# Patient Record
(demographics unavailable — no encounter records)

---

## 2025-02-13 NOTE — REVIEW OF SYSTEMS
[Constipation] : constipation [Negative] : Heme/Lymph [Recent Change In Weight] : ~T recent weight change [FreeTextEntry2] : +5lbs [FreeTextEntry3] : poor vision [FreeTextEntry7] : Hx external hemorrhoids  [de-identified] : ruma eczema  [FreeTextEntry1] : preDM, dyslipidemia

## 2025-02-13 NOTE — ASSESSMENT
[FreeTextEntry1] :  58 F with a PMHx of constipation/hemorrhoids, PreDM, dyslipidemia, poor vision and eczema here for f/u. #1 HTN: Renewed losartan-hctz 50/12.5mg daily. Continue diet and exercise. Risks of uncontrolled hypertension explained and understood. Check BW and urinalysis. F/u in 4 months and that will be CPE. #2 Breast calcification R breast: Sees [facility] breast surgeon PRN. Monitoring. Script provided for mammo/sono and get 2024 reports.  #3 Eczema: Asymptomatic. Sees derm Dr Drake every 6 months. #4 Dyslipidemia: Continue low cholesterol diet, exercise and maintaining a good weight. Check Lipids and LFTs.  #5 Impaired fasting glucose/PreDM: Last A1C was 5.8% and will recheck today. #6 Heartburn: Follow a GERD diet. Using tums PRN. #7 Poor Vision: UTD optho. #8 Post menopause: check vitamin D. #9 HCM: BW and UA done today. UTD with CPE and EKG. UTD with colonoscopy and derm. All the patient's questions and concerns were answered at the time of the visit. The patient is agreeable to above treatment plan.

## 2025-02-13 NOTE — PHYSICAL EXAM
[No Acute Distress] : no acute distress [Well-Appearing] : well-appearing [Normal Sclera/Conjunctiva] : normal sclera/conjunctiva [PERRL] : pupils equal round and reactive to light [EOMI] : extraocular movements intact [Normal Outer Ear/Nose] : the outer ears and nose were normal in appearance [Normal Oropharynx] : the oropharynx was normal [No JVD] : no jugular venous distention [No Lymphadenopathy] : no lymphadenopathy [Supple] : supple [Thyroid Normal, No Nodules] : the thyroid was normal and there were no nodules present [No Respiratory Distress] : no respiratory distress  [No Accessory Muscle Use] : no accessory muscle use [Clear to Auscultation] : lungs were clear to auscultation bilaterally [Normal Rate] : normal rate  [Regular Rhythm] : with a regular rhythm [Normal S1, S2] : normal S1 and S2 [No Murmur] : no murmur heard [No Edema] : there was no peripheral edema [Soft] : abdomen soft [Non Tender] : non-tender [Non-distended] : non-distended [Normal Bowel Sounds] : normal bowel sounds [Normal Posterior Cervical Nodes] : no posterior cervical lymphadenopathy [Normal Anterior Cervical Nodes] : no anterior cervical lymphadenopathy [No CVA Tenderness] : no CVA  tenderness [No Spinal Tenderness] : no spinal tenderness [No Joint Swelling] : no joint swelling [Grossly Normal Strength/Tone] : grossly normal strength/tone [No Rash] : no rash [Coordination Grossly Intact] : coordination grossly intact [No Focal Deficits] : no focal deficits [Normal Gait] : normal gait [Deep Tendon Reflexes (DTR)] : deep tendon reflexes were 2+ and symmetric [Speech Grossly Normal] : speech grossly normal [Memory Grossly Normal] : memory grossly normal [Normal Affect] : the affect was normal [Normal Mood] : the mood was normal [Normal Insight/Judgement] : insight and judgment were intact [Comprehensive Foot Exam Normal] : Right and left foot were examined and both feet are normal. No ulcers in either foot. Toes are normal and with full ROM.  Normal tactile sensation with monofilament testing throughout both feet [de-identified] : +5lbs [de-identified] :  glasses

## 2025-02-13 NOTE — ADDENDUM
[FreeTextEntry1] : Documented by Kyung Hall acting as a scribe for Dr. Mariann Multani. 02/13/2025  All medical record entries made by the Scribe were at my, Dr. Mariann Multani, direction and personally dictated by me on 02/13/2025. I have reviewed the chart and agree that the record accurately reflects my personal performance of the history, physical exam, assessment and plan. I have also personally directed, reviewed, and agreed with the chart.

## 2025-02-13 NOTE — HISTORY OF PRESENT ILLNESS
[de-identified] :  58 F with a PMHx of constipation/hemorrhoids, PreDM, dyslipidemia, poor vision and eczema here for f/u. Patient just arrived from family trip to [facility]. She also started playing pickleball and notes having knee pain and muscle achiness.  Reports that arthritis in knees has been ok and didn't need gel shots so far this year.  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  HTN controlled on med. Wants a refill.  Pt wants a chest x ray from secondhand smoke exposure because her mother  from lung cancer. She hasn't gone for this yet. Continues to eat well and is exercising. She will be starting tennis soon and sees  2x week.  PreDM and continues to diet and exercise. Patient notes hip pain that has now resolved. Says exercise and taking OTC medications helped.  She has poor vision and goes to [facility] annually and is due.  She is eating and sleeping well. The patient denies any current fevers, chills, cold symptoms, fatigue, headaches, dizziness, blurry vision, chest pain, palpitations, shortness of breath, dyspnea on exertion, cough, urinary symptoms or any nausea/vomiting/diarrhea. UTD PAP and UTD colonoscopy (2022). Had mammo with breast surgeon at [facility] and is due in May.

## 2025-02-13 NOTE — HEALTH RISK ASSESSMENT
[No falls in past year] : Patient reported no falls in the past year [0] : 2) Feeling down, depressed, or hopeless: Not at all (0) [PHQ-2 Negative - No further assessment needed] : PHQ-2 Negative - No further assessment needed [Never] : Never [Yes] : Yes [Monthly or less (1 pt)] : Monthly or less (1 point) [1 or 2 (0 pts)] : 1 or 2 (0 points) [Never (0 pts)] : Never (0 points) [Audit-CScore] : 1 [[id number]] : 0

## 2025-04-23 NOTE — ASSESSMENT
[FreeTextEntry1] : here for acute exacerbation of right knee primary OA  completed visco-3 series in feb of 2024 with relief until last week  will submit for repeat appario  use of cryotherapy discussed  activity modification discussed will rtc once gels received   I am working today under the supervision of Dr. Smith and I am following the plan of care of Dr. Smith as described by him on this date. Progress note completed by Akua Donis PA-C under the supervision of Dr. Smith.

## 2025-04-23 NOTE — IMAGING
[de-identified] : Varus deformity Mild effusion, no warmth, no ecchymosis Medial joint line tenderness to palpation  Range of motion 0-110 with associated crepitus

## 2025-04-23 NOTE — HISTORY OF PRESENT ILLNESS
[6] : 6 [4] : 4 [Dull/Aching] : dull/aching [de-identified] : 1/4/2024: 56 yo F here for increased right knee pain that last few weeks without injury. Last series of visco finished on 12/21/2022.   2/15/24: here to follow up on right knee. CSI from 1/4/24 gave relief for 5 weeks. Will begin visco-3 today.   2/22/24: Right knee Visco-3 #2.   2/29/24: Right knee Visco-3 #3.   4/23/25: patient is here today to follow up on her right knee pain  [FreeTextEntry1] : right knee

## 2025-05-05 NOTE — PHYSICAL EXAM
[Right] : right hand [Dorsal Wrist] : dorsal wrist [Volar Wrist] : volar wrist [Radial Styloid] : radial styloid [Ulna Styloid] : ulna styloid [TFCC] : TFCC [First Dorsal Compartment] : first dorsal compartment [] : palpable radial pulse [FreeTextEntry9] : Decreased ROM with pain.

## 2025-05-05 NOTE — HISTORY OF PRESENT ILLNESS
[8] : 8 [Throbbing] : throbbing [Constant] : constant [Nothing helps with pain getting better] : Nothing helps with pain getting better [Retired] : Work status: retired [de-identified] : 5/5/25 57 y/o F here with RT wrist pain starting 2 days ago. Denies injury or trauma. No prior HX.  [] : no

## 2025-05-05 NOTE — ASSESSMENT
[FreeTextEntry1] : ___ We reviewed the findings and her options. MDP x 1 to start tomorrow. Diclofenac this evening, then after pack is completed, if needed, with GI precautions. Thumb spica brace for comfort. RICE. Will see Dr. Bhatti in 1 week.  Patient seen by Joceline FOOTE who determined the assessment and plan and participated in all aspects of the office encounter.

## 2025-05-05 NOTE — REASON FOR VISIT
[FreeTextEntry2] : NEW CONDITION 5/5/25 This is a 58 year old RHD F with right wrist pain that started without injury on 5/3/25.  No treatment.  No numbness.  There is swelling.  Diclofenac didn't do much.  She has been wearing an OTC brace with a bar on the palm without much relief.

## 2025-05-12 NOTE — REASON FOR VISIT
[FreeTextEntry2] : 5/12/25: 57 y/o patient is here for R wrist pain since 1-2 weeks ago, was treated at our urgent care last week. pain has improved

## 2025-05-12 NOTE — ASSESSMENT
[FreeTextEntry1] : Thumb CMC  arthritis is a progressive problem that once occurs will be a chronic issue that will likely continue until surgical treatment is necessary. Treatment options for arthritis include OTC NSAIDS, prescription NSAIDS, ice, bracing, OT, cortisone injection, and surgery. Surgical options include arthroplasty and fusion of the arthritic joint.   I recommend the patient take over the counter medication such as Advil/Motrin/Aleve or Tylenol for pain and inflammation  I rec the patient ice the affected body part for 10-15mins 3-4 times a day for 5 days to reduce pain, swelling, and inflammation   Return prn- consider injection

## 2025-05-12 NOTE — HISTORY OF PRESENT ILLNESS
[3] : 3 [2] : 2 [] : yes [de-identified] : R wrist pain for about 2 weeks  Pain over the base of the thumb Was prescribed MDP, diclofenac and a brace at [facility]  She has finished the MDP, with some relief   For the first time I independently reviewed xrays from [facility] done on 5/5/2025 PA/Lateral/Oblique views of the R hand shows, thumb CMC OA   For the first time I independently reviewed xrays from [facility] done on 5/5/2025 PA/Lateral/Oblique views of the R wrist shows thumb CMC OA [FreeTextEntry1] : R wrist [de-identified] : GONZALO Castro [de-identified] : brace

## 2025-05-12 NOTE — PHYSICAL EXAM
[de-identified] : R hand: +thumb CMC swelling +thumb CMC tenderness Decreased thumb ROM +Basal grind test

## 2025-05-14 NOTE — IMAGING
[de-identified] : Varus deformity Mild effusion, no warmth, no ecchymosis Medial joint line tenderness to palpation  Range of motion 0-110 with associated crepitus

## 2025-05-14 NOTE — PROCEDURE
[FreeTextEntry3] : Large joint injection  was performed of the right knee.  The indication for this procedure was pain, inflammation and x-ray evidence of Osteoarthritis on this or prior visit. The site was prepped with alcohol, betadine, ethyl chloride sprayed topically and sterile technique used.  An injection of visco-3 series; # 2 was used.   Patient was advised to call if redness, pain or fever occur, apply ice for 15 minutes out of every hour for the next 12-24 hours as tolerated and patient was advised to rest the joint(s) for 2 days. Patient has tried OTC's including aspirin, Ibuprofen, Aleve, etc or prescription NSAIDS, and/or exercises at home and/or physical therapy without satisfactory response, patient had decreased mobility in the joint and the risks benefits, and alternatives have been discussed, and verbal consent was obtained.  Ultrasound guidance was indicated for this patient due to prior failure or difficult injection. All ultrasound images have been permanently captured and stored accordingly in our picture archiving and communication system. Visualization of the needle and placement of injection was performed without complication.

## 2025-05-21 NOTE — IMAGING
[de-identified] : Varus deformity Mild effusion, no warmth, no ecchymosis Medial joint line tenderness to palpation  Range of motion 0-110 with associated crepitus

## 2025-05-21 NOTE — ASSESSMENT
[FreeTextEntry1] : patient presents for follow-up of acute exacerbation of chronic knee arthritis Since last injection, the pain has remained the same  Since the last injection, the exam is stable without associated effusion Patient wishes to proceed with the last injection today Risks, benefits, alternatives discussed proceed with right knee visco-3 # 3 - tolerated procedure well - post procedure precautions discussed reviewed follow-up care and medications As symptoms are the same, we advised to continue same dose/frequency of their prescription anti-inflammatory medication. has been taking diclofenac 50mg prn - side effects reviewed - instructed how to take medication - has reported no side effects as of yet  use of cryotherapy discussed Continue to work on a healthy diet and low impact exercise after completing injection series. consider CSI injection, medication adjustments and/or additional testing in 6 weeks if remains symptomatic rtc prn, discussed timing of repeat injections   I am working today under the supervision of Dr. Smith and I am following the plan of care of Dr. Smith as described by him on this date. Progress note completed by Akua Donis PA-C under the supervision of Dr. Smith.

## 2025-05-21 NOTE — PROCEDURE
[FreeTextEntry3] : Large joint injection  was performed of the right knee.  The indication for this procedure was pain, inflammation and x-ray evidence of Osteoarthritis on this or prior visit. The site was prepped with alcohol, betadine, ethyl chloride sprayed topically and sterile technique used.  An injection of visco-3 series; # 3 was used.   Patient was advised to call if redness, pain or fever occur, apply ice for 15 minutes out of every hour for the next 12-24 hours as tolerated and patient was advised to rest the joint(s) for 2 days. Patient has tried OTC's including aspirin, Ibuprofen, Aleve, etc or prescription NSAIDS, and/or exercises at home and/or physical therapy without satisfactory response, patient had decreased mobility in the joint and the risks benefits, and alternatives have been discussed, and verbal consent was obtained.  Ultrasound guidance was indicated for this patient due to prior failure or difficult injection. All ultrasound images have been permanently captured and stored accordingly in our picture archiving and communication system. Visualization of the needle and placement of injection was performed without complication.

## 2025-05-28 NOTE — IMAGING
[de-identified] : Knee Exam:  Inspection: no warmth, no ecchymosis Palpation: Medial joint line tenderness to palpation, Negative Tess Range of motion: 0-110 with associated crepitus Strength: 5/5 quadriceps and hamstring strength

## 2025-05-28 NOTE — ASSESSMENT
[FreeTextEntry1] : She presents for exacerbation of chronic right knee arthritis.  She finished the viscosupplementation series last week and thinks she reaggravated last weekend.  She attended her daughter's graduation and possibly the weather changes have triggered another flareup.  There is no effusion warmth or swelling to indicate infectious or inflammatory process.  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  She wants to think about it.  She does not think she is ready for surgery yet.  I have started her on meloxicam 15 mg prescription for maintenance therapy in the interim.  Corticosteroid injection administered for relief of acute symptoms.  She will follow-up in 4 to 6 weeks to reassess.  The patient's current medication management of their orthopedic diagnosis was reviewed today: There is a moderate risk of morbidity with further treatment, especially from use of prescription strength medications and possible side effects of these medications which include upset stomach with oral medications, skin reactions to topical medications and cardiac/renal/diabetes issues with long term use.